# Patient Record
Sex: FEMALE | Race: WHITE | NOT HISPANIC OR LATINO
[De-identification: names, ages, dates, MRNs, and addresses within clinical notes are randomized per-mention and may not be internally consistent; named-entity substitution may affect disease eponyms.]

---

## 2020-02-04 ENCOUNTER — RESULT REVIEW (OUTPATIENT)
Age: 50
End: 2020-02-04

## 2020-02-14 PROBLEM — Z00.00 ENCOUNTER FOR PREVENTIVE HEALTH EXAMINATION: Status: ACTIVE | Noted: 2020-02-14

## 2020-03-09 ENCOUNTER — APPOINTMENT (OUTPATIENT)
Dept: SURGICAL ONCOLOGY | Facility: CLINIC | Age: 50
End: 2020-03-09
Payer: COMMERCIAL

## 2020-03-09 VITALS
SYSTOLIC BLOOD PRESSURE: 110 MMHG | RESPIRATION RATE: 18 BRPM | BODY MASS INDEX: 19.66 KG/M2 | HEART RATE: 80 BPM | DIASTOLIC BLOOD PRESSURE: 62 MMHG | WEIGHT: 118 LBS | HEIGHT: 65 IN

## 2020-03-09 DIAGNOSIS — Z86.59 PERSONAL HISTORY OF OTHER MENTAL AND BEHAVIORAL DISORDERS: ICD-10-CM

## 2020-03-09 DIAGNOSIS — Z86.79 PERSONAL HISTORY OF OTHER DISEASES OF THE CIRCULATORY SYSTEM: ICD-10-CM

## 2020-03-09 DIAGNOSIS — D24.2 BENIGN NEOPLASM OF LEFT BREAST: ICD-10-CM

## 2020-03-09 DIAGNOSIS — Z78.9 OTHER SPECIFIED HEALTH STATUS: ICD-10-CM

## 2020-03-09 DIAGNOSIS — Z86.39 PERSONAL HISTORY OF OTHER ENDOCRINE, NUTRITIONAL AND METABOLIC DISEASE: ICD-10-CM

## 2020-03-09 PROCEDURE — 99204 OFFICE O/P NEW MOD 45 MIN: CPT

## 2020-03-09 RX ORDER — ESCITALOPRAM OXALATE 10 MG/1
10 TABLET ORAL
Refills: 0 | Status: ACTIVE | COMMUNITY

## 2020-03-09 RX ORDER — ALPRAZOLAM 0.25 MG/1
0.25 TABLET ORAL
Refills: 0 | Status: ACTIVE | COMMUNITY

## 2020-03-09 NOTE — CONSULT LETTER
[Dear  ___] : Dear  [unfilled], [Consult Letter:] : I had the pleasure of evaluating your patient, [unfilled]. [Please see my note below.] : Please see my note below. [Consult Closing:] : Thank you very much for allowing me to participate in the care of this patient.  If you have any questions, please do not hesitate to contact me. [Sincerely,] : Sincerely, [DrHolden  ___] : Dr. VELAZQUEZ [FreeTextEntry2] : Immanuel Morrison MD

## 2020-03-09 NOTE — OB HISTORY
[Menarche Age ____] : menarche age [unfilled] [Definite ___ (Date)] : the last menstrual period was [unfilled] [Total Preg ___] : G[unfilled] [Live Births ___] : P[unfilled]  [FreeTextEntry2] : age 20 at first pregnancy

## 2020-03-09 NOTE — HISTORY OF PRESENT ILLNESS
[de-identified] : 49 year-old female presents for an initial consultation.  She was referred for breast imaging to evaluate a palpable left breast mass which had been present for approximately 3 years, without significant enlargement.  There were no suspicious findings on mammogram.  On ultrasound, there are bilateral breast nodules for which 6 montn follow up ultrasound was recommended, and in the area of palpable concern in the left breast at 5:00, 1 cmfn, there is a 1.1 cm mass for which ultrasound guided biopsy was recommended (BIRADS 4). \par \par Biopsy was performed on 2/4/20 and pathology was consistent with an intraductal papilloma with focal atypical ductal hyperplasia (atypical papilloma). \par \par She has a prior history of a fibroadenoma excised from her right breast over 10 years ago.  She denies any family history of breast or ovarian cancer.  She denies any additional palpable breast masses or nipple discharge.

## 2020-03-09 NOTE — PHYSICAL EXAM
[Normal] : supple, no neck mass and thyroid not enlarged [Normal Supraclavicular Lymph Nodes] : normal supraclavicular lymph nodes [Normal Axillary Lymph Nodes] : normal axillary lymph nodes [Normal] : oriented to person, place and time, with appropriate affect [FreeTextEntry1] : AB present during exam  [de-identified] : Normal S1, S2.  Regular rate and rhythm.  [de-identified] : Complete breast exam performed in supine and upright positions.  Approximately 1 cm well bordered mobile mass left breast 5:00, 1 cmfn.   No palpable right breast masses.  No tenderness, nipple discharge, inversion, deviation or enlarged axillary or supraclavicular lymph nodes bilaterally.  [de-identified] : Clear breath sounds bilaterally, normal respiratory effort.

## 2020-03-09 NOTE — ASSESSMENT
[FreeTextEntry1] : IMP:\par Intraductal papilloma with focal atypical ductal hyperplasia involving the left breast\par Probable benign nodules on ultrasound bilaterally\par \par PLAN:\par Discussed nature of and rationale of excisional left breast biopsy.  Options, risks and benefits of surgery discussed with patient. Will ultimately need bilateral ultrasound in 6 months.

## 2020-03-12 ENCOUNTER — OUTPATIENT (OUTPATIENT)
Dept: OUTPATIENT SERVICES | Facility: HOSPITAL | Age: 50
LOS: 1 days | End: 2020-03-12
Payer: COMMERCIAL

## 2020-03-12 VITALS
DIASTOLIC BLOOD PRESSURE: 59 MMHG | WEIGHT: 119.05 LBS | HEART RATE: 66 BPM | RESPIRATION RATE: 16 BRPM | TEMPERATURE: 98 F | SYSTOLIC BLOOD PRESSURE: 122 MMHG | HEIGHT: 65 IN | OXYGEN SATURATION: 97 %

## 2020-03-12 DIAGNOSIS — F41.8 OTHER SPECIFIED ANXIETY DISORDERS: ICD-10-CM

## 2020-03-12 DIAGNOSIS — Z90.89 ACQUIRED ABSENCE OF OTHER ORGANS: Chronic | ICD-10-CM

## 2020-03-12 DIAGNOSIS — D36.9 BENIGN NEOPLASM, UNSPECIFIED SITE: Chronic | ICD-10-CM

## 2020-03-12 DIAGNOSIS — Z01.818 ENCOUNTER FOR OTHER PREPROCEDURAL EXAMINATION: ICD-10-CM

## 2020-03-12 DIAGNOSIS — N63.0 UNSPECIFIED LUMP IN UNSPECIFIED BREAST: Chronic | ICD-10-CM

## 2020-03-12 DIAGNOSIS — N63.0 UNSPECIFIED LUMP IN UNSPECIFIED BREAST: ICD-10-CM

## 2020-03-12 LAB
HCT VFR BLD CALC: 39.8 % — SIGNIFICANT CHANGE UP (ref 34.5–45)
HGB BLD-MCNC: 12.6 G/DL — SIGNIFICANT CHANGE UP (ref 11.5–15.5)
MCHC RBC-ENTMCNC: 29.6 PG — SIGNIFICANT CHANGE UP (ref 27–34)
MCHC RBC-ENTMCNC: 31.7 GM/DL — LOW (ref 32–36)
MCV RBC AUTO: 93.4 FL — SIGNIFICANT CHANGE UP (ref 80–100)
NRBC # BLD: 0 /100 WBCS — SIGNIFICANT CHANGE UP (ref 0–0)
PLATELET # BLD AUTO: 165 K/UL — SIGNIFICANT CHANGE UP (ref 150–400)
RBC # BLD: 4.26 M/UL — SIGNIFICANT CHANGE UP (ref 3.8–5.2)
RBC # FLD: 11.8 % — SIGNIFICANT CHANGE UP (ref 10.3–14.5)
WBC # BLD: 5.17 K/UL — SIGNIFICANT CHANGE UP (ref 3.8–10.5)
WBC # FLD AUTO: 5.17 K/UL — SIGNIFICANT CHANGE UP (ref 3.8–10.5)

## 2020-03-12 PROCEDURE — 85027 COMPLETE CBC AUTOMATED: CPT

## 2020-03-12 PROCEDURE — G0463: CPT

## 2020-03-12 NOTE — H&P PST ADULT - NSANTHOSAYNRD_GEN_A_CORE
No. JULISA screening performed.  STOP BANG Legend: 0-2 = LOW Risk; 3-4 = INTERMEDIATE Risk; 5-8 = HIGH Risk

## 2020-03-12 NOTE — H&P PST ADULT - MS GEN HX ROS MEA POS PC
· Baseline hgb 11-12, stable in mid 9's at present  · Likely due to bleeding from decubitus ulcer   · F/u hemetest arthritis/joint pain/leg pain R/leg pain L

## 2020-03-12 NOTE — H&P PST ADULT - NSICDXPASTSURGICALHX_GEN_ALL_CORE_FT
PAST SURGICAL HISTORY:  Breast lump R breast lump removed,    History of mastoidectomy Rt, > 10 years ago    Intraductal papilloma left breast biopsy 2/2020

## 2020-03-12 NOTE — H&P PST ADULT - HISTORY OF PRESENT ILLNESS
49 year old female with  h/o removal of  R  breast lump about 10 years ago, now with palpable left breast mass which had been present for approximately 3 years, without significant enlargement. Biopsy was performed on 2/4/20 and pathology was consistent with an intraductal papilloma with focal atypical ductal hyperplasia (atypical papilloma) of left breast..  Scheduled for left breast lumpectomy on 03/18/2020.    **** Patient (or family members) denies travel outside the country , not visited any sick person.   Denies fever, cough, shortness of breadth, diarrhea throat pain or any rash.

## 2020-03-12 NOTE — H&P PST ADULT - NSICDXPASTMEDICALHX_GEN_ALL_CORE_FT
PAST MEDICAL HISTORY:  Anxiety with depression     History of osteopenia     Intraductal carcinoma of left breast     Lump, breast h/o R lumpectomy, benign, left lump breast now

## 2020-03-12 NOTE — H&P PST ADULT - NSICDXPROBLEM_GEN_ALL_CORE_FT
PROBLEM DIAGNOSES  Problem: Lump, breast  Assessment and Plan: Left breast lumpectomy   [facility] DOS      Problem: Anxiety with depression  Assessment and Plan: Instructed to continue meds &  take with sips of water in AM the day of surgery

## 2020-03-18 ENCOUNTER — APPOINTMENT (OUTPATIENT)
Dept: SURGICAL ONCOLOGY | Facility: HOSPITAL | Age: 50
End: 2020-03-18

## 2020-06-04 PROBLEM — F41.8 OTHER SPECIFIED ANXIETY DISORDERS: Chronic | Status: ACTIVE | Noted: 2020-03-12

## 2020-06-13 ENCOUNTER — OUTPATIENT (OUTPATIENT)
Dept: OUTPATIENT SERVICES | Facility: HOSPITAL | Age: 50
LOS: 1 days | End: 2020-06-13
Payer: COMMERCIAL

## 2020-06-13 DIAGNOSIS — Z11.59 ENCOUNTER FOR SCREENING FOR OTHER VIRAL DISEASES: ICD-10-CM

## 2020-06-13 DIAGNOSIS — D36.9 BENIGN NEOPLASM, UNSPECIFIED SITE: Chronic | ICD-10-CM

## 2020-06-13 DIAGNOSIS — Z90.89 ACQUIRED ABSENCE OF OTHER ORGANS: Chronic | ICD-10-CM

## 2020-06-13 DIAGNOSIS — N63.0 UNSPECIFIED LUMP IN UNSPECIFIED BREAST: Chronic | ICD-10-CM

## 2020-06-13 LAB — SARS-COV-2 RNA SPEC QL NAA+PROBE: SIGNIFICANT CHANGE UP

## 2020-06-13 PROCEDURE — U0003: CPT

## 2020-06-13 RX ORDER — ESCITALOPRAM OXALATE 10 MG/1
2 TABLET, FILM COATED ORAL
Qty: 0 | Refills: 0 | DISCHARGE

## 2020-06-13 RX ORDER — SODIUM CHLORIDE 9 MG/ML
3 INJECTION INTRAMUSCULAR; INTRAVENOUS; SUBCUTANEOUS EVERY 8 HOURS
Refills: 0 | Status: DISCONTINUED | OUTPATIENT
Start: 2020-06-16 | End: 2020-07-01

## 2020-06-13 RX ORDER — CHLORHEXIDINE GLUCONATE 213 G/1000ML
1 SOLUTION TOPICAL ONCE
Refills: 0 | Status: DISCONTINUED | OUTPATIENT
Start: 2020-06-16 | End: 2020-07-01

## 2020-06-13 RX ORDER — OMEGA-3 ACID ETHYL ESTERS 1 G
1 CAPSULE ORAL
Qty: 0 | Refills: 0 | DISCHARGE

## 2020-06-13 RX ORDER — LIDOCAINE HCL 20 MG/ML
0.2 VIAL (ML) INJECTION ONCE
Refills: 0 | Status: DISCONTINUED | OUTPATIENT
Start: 2020-06-16 | End: 2020-07-01

## 2020-06-15 ENCOUNTER — TRANSCRIPTION ENCOUNTER (OUTPATIENT)
Age: 50
End: 2020-06-15

## 2020-06-15 RX ORDER — OXYCODONE HYDROCHLORIDE 5 MG/1
5 TABLET ORAL ONCE
Refills: 0 | Status: DISCONTINUED | OUTPATIENT
Start: 2020-06-16 | End: 2020-06-16

## 2020-06-15 RX ORDER — CELECOXIB 200 MG/1
200 CAPSULE ORAL ONCE
Refills: 0 | Status: DISCONTINUED | OUTPATIENT
Start: 2020-06-16 | End: 2020-07-01

## 2020-06-15 RX ORDER — SODIUM CHLORIDE 9 MG/ML
1000 INJECTION, SOLUTION INTRAVENOUS
Refills: 0 | Status: DISCONTINUED | OUTPATIENT
Start: 2020-06-16 | End: 2020-07-01

## 2020-06-15 RX ORDER — ONDANSETRON 8 MG/1
4 TABLET, FILM COATED ORAL ONCE
Refills: 0 | Status: DISCONTINUED | OUTPATIENT
Start: 2020-06-16 | End: 2020-07-01

## 2020-06-16 ENCOUNTER — APPOINTMENT (OUTPATIENT)
Dept: SURGICAL ONCOLOGY | Facility: HOSPITAL | Age: 50
End: 2020-06-16

## 2020-06-16 ENCOUNTER — RESULT REVIEW (OUTPATIENT)
Age: 50
End: 2020-06-16

## 2020-06-16 ENCOUNTER — OUTPATIENT (OUTPATIENT)
Dept: OUTPATIENT SERVICES | Facility: HOSPITAL | Age: 50
LOS: 1 days | End: 2020-06-16
Payer: COMMERCIAL

## 2020-06-16 VITALS
SYSTOLIC BLOOD PRESSURE: 101 MMHG | HEART RATE: 72 BPM | TEMPERATURE: 98 F | OXYGEN SATURATION: 98 % | RESPIRATION RATE: 16 BRPM | DIASTOLIC BLOOD PRESSURE: 54 MMHG

## 2020-06-16 VITALS — TEMPERATURE: 97 F

## 2020-06-16 DIAGNOSIS — N63.0 UNSPECIFIED LUMP IN UNSPECIFIED BREAST: Chronic | ICD-10-CM

## 2020-06-16 DIAGNOSIS — D36.9 BENIGN NEOPLASM, UNSPECIFIED SITE: Chronic | ICD-10-CM

## 2020-06-16 DIAGNOSIS — N63.10 UNSPECIFIED LUMP IN THE RIGHT BREAST, UNSPECIFIED QUADRANT: ICD-10-CM

## 2020-06-16 DIAGNOSIS — Z90.89 ACQUIRED ABSENCE OF OTHER ORGANS: Chronic | ICD-10-CM

## 2020-06-16 LAB
ANION GAP SERPL CALC-SCNC: 7 MMOL/L — SIGNIFICANT CHANGE UP (ref 5–17)
BUN SERPL-MCNC: 20 MG/DL — SIGNIFICANT CHANGE UP (ref 7–23)
CALCIUM SERPL-MCNC: 10.4 MG/DL — SIGNIFICANT CHANGE UP (ref 8.4–10.5)
CHLORIDE SERPL-SCNC: 104 MMOL/L — SIGNIFICANT CHANGE UP (ref 96–108)
CO2 SERPL-SCNC: 29 MMOL/L — SIGNIFICANT CHANGE UP (ref 22–31)
CREAT SERPL-MCNC: 0.84 MG/DL — SIGNIFICANT CHANGE UP (ref 0.5–1.3)
GLUCOSE SERPL-MCNC: 87 MG/DL — SIGNIFICANT CHANGE UP (ref 70–99)
HCT VFR BLD CALC: 42.1 % — SIGNIFICANT CHANGE UP (ref 34.5–45)
HGB BLD-MCNC: 13.9 G/DL — SIGNIFICANT CHANGE UP (ref 11.5–15.5)
MCHC RBC-ENTMCNC: 30 PG — SIGNIFICANT CHANGE UP (ref 27–34)
MCHC RBC-ENTMCNC: 33 GM/DL — SIGNIFICANT CHANGE UP (ref 32–36)
MCV RBC AUTO: 90.7 FL — SIGNIFICANT CHANGE UP (ref 80–100)
NRBC # BLD: 0 /100 WBCS — SIGNIFICANT CHANGE UP (ref 0–0)
PLATELET # BLD AUTO: 160 K/UL — SIGNIFICANT CHANGE UP (ref 150–400)
POTASSIUM SERPL-MCNC: 4.2 MMOL/L — SIGNIFICANT CHANGE UP (ref 3.5–5.3)
POTASSIUM SERPL-SCNC: 4.2 MMOL/L — SIGNIFICANT CHANGE UP (ref 3.5–5.3)
RBC # BLD: 4.64 M/UL — SIGNIFICANT CHANGE UP (ref 3.8–5.2)
RBC # FLD: 11.9 % — SIGNIFICANT CHANGE UP (ref 10.3–14.5)
SODIUM SERPL-SCNC: 140 MMOL/L — SIGNIFICANT CHANGE UP (ref 135–145)
WBC # BLD: 6 K/UL — SIGNIFICANT CHANGE UP (ref 3.8–10.5)
WBC # FLD AUTO: 6 K/UL — SIGNIFICANT CHANGE UP (ref 3.8–10.5)

## 2020-06-16 PROCEDURE — 85027 COMPLETE CBC AUTOMATED: CPT

## 2020-06-16 PROCEDURE — 88305 TISSUE EXAM BY PATHOLOGIST: CPT

## 2020-06-16 PROCEDURE — 88305 TISSUE EXAM BY PATHOLOGIST: CPT | Mod: 26

## 2020-06-16 PROCEDURE — 19301 PARTIAL MASTECTOMY: CPT | Mod: LT

## 2020-06-16 PROCEDURE — 80048 BASIC METABOLIC PNL TOTAL CA: CPT

## 2020-06-16 NOTE — ASU PATIENT PROFILE, ADULT - PSH
Breast lump  R breast lump removed 2010  History of right mastoidectomy  2010  Intraductal papilloma  left breast biopsy 2/2020

## 2020-06-16 NOTE — ASU DISCHARGE PLAN (ADULT/PEDIATRIC) - NURSING INSTRUCTIONS
You may take Tylenol today/tonight every 6 hours for pain management. Do not exceed more than 4000mg of Tylenol in one 24 hour setting. If no contraindications, you may alternate with Ibuprofen or Naproxen 3 hours after dose of Tylenol. Ibuprofen can be taken every 6 hours. Naproxen may be taken every 12 hours.

## 2020-06-16 NOTE — ASU PATIENT PROFILE, ADULT - PMH
Anxiety with depression    Current smoker    History of osteopenia  on no med  [place] (hard of hearing)  right ear  Intraductal papilloma  left breast 2/2020  Irritable bowel syndrome (IBS)    Lump, breast  h/o R lumpectomy, benign  MVP (mitral valve prolapse)  2017, no SBE

## 2020-06-16 NOTE — ASU DISCHARGE PLAN (ADULT/PEDIATRIC) - CARE PROVIDER_API CALL
Binh Manzo  SURGERY  [address] [place] [place] [address]  Phone: [phone]  Fax: [phone]  Follow Up Time: 2 weeks

## 2020-06-16 NOTE — ASU DISCHARGE PLAN (ADULT/PEDIATRIC) - ASU DC SPECIAL INSTRUCTIONSFT
[facility]  CANCER  I  N  S  T  I  T  U  T E     Department of Surgery  Division of Surgical Oncology    Steven Payton M.D., ELMER.ESTRELLA.  Chief, Division of Surgical Oncology    Rajiv Mckeon M.D., F.A.C.S., F.A.S.LYNN.  Associate , Department of Surgery    Carlos Holly M.D., F.A.C.S.  Jeramy Pride M.D., F.A.C.S.  Eze Pace M.D., F.A.C.S.  Duane Slaughter M.D., F.A.C.S.  Jeramy Campbell M.D., F.A.C.S.  Binh Manzo M.D., F.CHRISTINAC.S.      Breast Biopsy/Lumpectomy Post-operative Instructions  1.	Supportive bra- Please bring a sports/athletic bra with you on the day of your surgery.  You will wear it home from the hospital.  You should wear the supportive bra continuously for 48 hours after the procedure, including wearing it to sleep.  Therefore, you may wear your regular bra.  You may remove the bra to shower.  The sports bra will provide support, decrease the amount of swelling at the biopsy site, and make your recovery more comfortable.    2.	Wound dressing- There is a dressing on the wound, which consists of 2 layers.  The outer layer is a clear plastic dressing (called Tegaderm) which is waterproof.  This should remain in place for 2 days post-operatively.  On the 2nd post-operative day, you should remove the clear plastic Tegaderm dressing.  Underneath the Tegaderm dressing, there are white surgical tapes (called steri strips) which are directly adherent to the skin.  These should remain on the skin, and will peel off naturally.  All of the stitches are “internal” and will dissolve naturally.    3.	Showering/Bathing- You may shower over the dressing the very next day after surgery.  Allow the water to run over the dressing, but don not scrub the area.  It is best not to sit in a bathtub or swimming pool for at least one week after surgery.    4.	Activity level- You may resume most normal daily activity as tolerated, but avoid strenuous activities such as aerobics, jogging, exercising or heavy lifting for at least 1 week after surgery.  You may return to work in 1-2 days after surgery.  You may drive as long as you are not taking any prescription pain medication.    5.	Pain Medication- You may take the prescribed medication, or you may take extra-strength Tylenol as needed.  Please don to take aspirin, Motrin, Advil or any other anti-inflammatory medications, as these medications may cause bleeding or bruising.    6.	Follow-up Appointment- Please call the office to schedule your post-operative appointment which should be approximately 10 days after your surgery. Office [phone]    7.	Bruising/Bleeding/Swelling- It is normal for there to be some bruising and swelling at the breast biopsy site, and there may be some staining of blood on to the dressing.  Some discomfort at the surgical site is expected.  If your symptoms seem excessive, or if you have any question or concerns, please call the office.      Anesthesia Precautions:  For the next 12 hours do not:   •	drive a car,  •	drink alcohol, beer, or wine,   •	make important personal or business decisions  Diet:   •	Progress diet slowly unless otherwise indicated    Physician Notification  •	Any Prescription issues  •	Bleeding that does not stop  •	Persistent nausea and vomiting  •	Pain not relieved by medications  •	Fever greater than 101®F  •	Inability to tolerate liquids or foods  •	Unable to urinate after 8 hours  Discharge and Disposition  •	Discharge to home/ group home/assisted living  •	Accompanied by Family/Spouse/ Parents/ Significant Other/ Friend/ and or Caregiver    Follow Up Care:  •	In the event that you develop a complication and you are unable to reach your own physician, you may contact:  [phone] or go to the nearest Emergency Room.   •	Please call your surgeon to schedule your follow up appointment [phone]

## 2020-06-17 PROBLEM — K58.9 IRRITABLE BOWEL SYNDROME WITHOUT DIARRHEA: Chronic | Status: ACTIVE | Noted: 2020-06-13

## 2020-06-17 PROBLEM — Z87.39 PERSONAL HISTORY OF OTHER DISEASES OF THE MUSCULOSKELETAL SYSTEM AND CONNECTIVE TISSUE: Chronic | Status: ACTIVE | Noted: 2020-03-12

## 2020-06-17 PROBLEM — I34.1 NONRHEUMATIC MITRAL (VALVE) PROLAPSE: Chronic | Status: ACTIVE | Noted: 2020-06-13

## 2020-06-17 PROBLEM — H91.90 UNSPECIFIED HEARING LOSS, UNSPECIFIED EAR: Chronic | Status: ACTIVE | Noted: 2020-06-13

## 2020-06-17 PROBLEM — K58.9 IRRITABLE BOWEL SYNDROME, UNSPECIFIED: Chronic | Status: ACTIVE | Noted: 2020-06-13

## 2020-06-17 PROBLEM — N63.0 UNSPECIFIED LUMP IN UNSPECIFIED BREAST: Chronic | Status: ACTIVE | Noted: 2020-03-12

## 2020-06-17 PROBLEM — D36.9 BENIGN NEOPLASM, UNSPECIFIED SITE: Chronic | Status: ACTIVE | Noted: 2020-06-13

## 2020-06-17 PROBLEM — F17.200 NICOTINE DEPENDENCE, UNSPECIFIED, UNCOMPLICATED: Chronic | Status: ACTIVE | Noted: 2020-06-13

## 2020-06-26 LAB — SURGICAL PATHOLOGY STUDY: SIGNIFICANT CHANGE UP

## 2020-06-29 ENCOUNTER — APPOINTMENT (OUTPATIENT)
Dept: SURGICAL ONCOLOGY | Facility: CLINIC | Age: 50
End: 2020-06-29
Payer: COMMERCIAL

## 2020-06-29 VITALS
BODY MASS INDEX: 19.66 KG/M2 | SYSTOLIC BLOOD PRESSURE: 108 MMHG | OXYGEN SATURATION: 97 % | HEART RATE: 68 BPM | WEIGHT: 118 LBS | RESPIRATION RATE: 18 BRPM | HEIGHT: 65 IN | DIASTOLIC BLOOD PRESSURE: 68 MMHG

## 2020-06-29 VITALS — TEMPERATURE: 97.5 F

## 2020-06-29 DIAGNOSIS — D24.9 BENIGN NEOPLASM OF UNSPECIFIED BREAST: ICD-10-CM

## 2020-06-29 PROCEDURE — 99024 POSTOP FOLLOW-UP VISIT: CPT

## 2020-06-29 NOTE — ASSESSMENT
[FreeTextEntry1] : IMP:\par Status post left breast lumpectomy on 6/16/20.  \par Final pathology demonstrated intraductal papilloma.  A small focus of atypical ductal hyperplasia is found within the papilloma. \par Previously identified probable benign nodules on ultrasound bilaterally.\par Referred to med onc for discussion of chemo prevention\par \par PLAN:\par Bilateral breast ultrasound 9/2020\par RTO 1 yr.

## 2020-06-29 NOTE — PHYSICAL EXAM
[FreeTextEntry1] : DT was present [de-identified] : Incision healing well. No evidence of hematoma seroma or infection.

## 2020-06-29 NOTE — HISTORY OF PRESENT ILLNESS
[de-identified] : 49 year-old female presents for an initial postop visit, status post left breast lumpectomy on 6/16/20.  Final pathology demonstrated intraductal papilloma.  A small focus of atypical ductal hyperplasia is found within the papilloma. \par \par Previous pertinent history is as follows;\par \par She was initially seen in consultation on 3/19/20.  She was referred for breast imaging to evaluate a palpable left breast mass which had been present for approximately 3 years, without significant enlargement.  There were no suspicious findings on mammogram.  On ultrasound, there are bilateral breast nodules for which 6 montn follow up ultrasound was recommended, and in the area of palpable concern in the left breast at 5:00, 1 cmfn, there is a 1.1 cm mass for which ultrasound guided biopsy was recommended (BIRADS 4). \par \par Biopsy was performed on 2/4/20 and pathology was consistent with an intraductal papilloma with focal atypical ductal hyperplasia (atypical papilloma). \par \par She has a prior history of a fibroadenoma excised from her right breast over 10 years ago.  She denies any family history of breast or ovarian cancer.  She denies any additional palpable breast masses or nipple discharge.

## 2021-01-19 NOTE — H&P PST ADULT - NS PRO LAST MENSTRUAL DATE
89F presents to ED via EMS with chest pain. Patient was seen by home care RN who suggested patient come to the ED. Patient was c/o chest pain 2-3 hours before ED arrival. EMS states they found inferior wall MI on her EKG. According to EMS, patient is at her baseline. Family was not able to give information either. She received 4 asa by EMS. Patient comes awake alert and moaning in pain. Wheezes noted on auscultation. Attempted to use  phone as patient speaks mandarin, but patient is too hard of hearing to hear the . She was placed on tele and EKG performed immediately.
03/12/20

## 2021-03-15 ENCOUNTER — RESULT REVIEW (OUTPATIENT)
Age: 51
End: 2021-03-15

## 2021-06-01 ENCOUNTER — APPOINTMENT (OUTPATIENT)
Dept: SURGICAL ONCOLOGY | Facility: CLINIC | Age: 51
End: 2021-06-01

## 2021-06-21 NOTE — HISTORY OF PRESENT ILLNESS
[de-identified] : 50 year-old female presents for a follow up visit.  She is s/p left breast lumpectomy on 6/16/20.  Final pathology demonstrated intraductal papilloma with sclerosis and a small focus of atypical ductal hyperplasia was found within the papilloma.   Med-onc for ADH????\par \par Patient underwent a bilateral screening mammogram on 1/12/2021 revealing dense breast tissue however no evidence of malignancy.  BIRADS 2 imaging. \par \par Today she is doing well, No dominant mass, nipple discharge, nipple inversion, skin change or breast pain.\par \par PERTINENT HISTORY:\par She was initially seen in consultation on 3/19/20.  She was referred for breast imaging to evaluate a palpable left breast mass which had been present for approximately 3 years, without significant enlargement.  There were no suspicious findings on mammogram.  On ultrasound, there are bilateral breast nodules for which 6 montn follow up ultrasound was recommended, and in the area of palpable concern in the left breast at 5:00, 1 cmfn, there is a 1.1 cm mass for which ultrasound guided biopsy was recommended (BIRADS 4). \par \par Biopsy was performed on 2/4/20 and pathology was consistent with an intraductal papilloma with focal atypical ductal hyperplasia (atypical papilloma). \par \par She has a prior history of a fibroadenoma excised from her right breast over 10 years ago.  She denies any family history of breast or ovarian cancer.  She denies any additional palpable breast masses or nipple discharge.

## 2021-06-21 NOTE — ASSESSMENT
[FreeTextEntry1] : IMP:\par Status post left breast lumpectomy on 6/16/20.  \par Final pathology demonstrated intraductal papilloma and a small focus of ADH\par Mammogram 1/2021- Extremely dense breast tissue. BIRADS 2 \par \par PLAN:\par Sonogram now due to dense breast tissue? \par Annual mammo/sono 1/2022\par RTO yearly \par Med-onc f/u????

## 2021-06-22 ENCOUNTER — APPOINTMENT (OUTPATIENT)
Dept: SURGICAL ONCOLOGY | Facility: CLINIC | Age: 51
End: 2021-06-22
Payer: COMMERCIAL

## 2021-07-14 PROBLEM — N60.99 ATYPICAL DUCTAL HYPERPLASIA OF BREAST: Status: ACTIVE | Noted: 2020-03-09

## 2021-07-20 ENCOUNTER — APPOINTMENT (OUTPATIENT)
Dept: SURGICAL ONCOLOGY | Facility: CLINIC | Age: 51
End: 2021-07-20
Payer: COMMERCIAL

## 2021-07-20 VITALS
DIASTOLIC BLOOD PRESSURE: 70 MMHG | HEIGHT: 65 IN | WEIGHT: 124 LBS | BODY MASS INDEX: 20.66 KG/M2 | SYSTOLIC BLOOD PRESSURE: 114 MMHG | HEART RATE: 67 BPM | OXYGEN SATURATION: 98 % | RESPIRATION RATE: 18 BRPM

## 2021-07-20 DIAGNOSIS — N60.99 UNSPECIFIED BENIGN MAMMARY DYSPLASIA OF UNSPECIFIED BREAST: ICD-10-CM

## 2021-07-20 PROCEDURE — 99214 OFFICE O/P EST MOD 30 MIN: CPT

## 2021-07-20 NOTE — HISTORY OF PRESENT ILLNESS
[de-identified] : 50 year-old female presents for a 1 year follow up visit.  Pt. is status post left breast lumpectomy on 6/16/20.  Final pathology demonstrated intraductal papilloma and a small focus of atypical ductal hyperplasia is found within the papilloma. \par \par MMG on 1/12/21 revealed extremely dense breasts but no evidence of malignancy, BIRADS 2.  \par Breast ultrasound should be considered due to very dense breast tissue. \par \par Pt. states her mom passed away from pancreatic cancer a few weeks away.  She is scheduled to see Med-onc to discuss her family history.  Her brother had colon cancer. \par \par Denies palpable breast masses, nipple discharge, skin dimpling, inversion or breast pain. \par \par \par PERTINENT HISTORY:\par She was initially seen in consultation on 3/19/20.  She was referred for breast imaging to evaluate a palpable left breast mass which had been present for approximately 3 years, without significant enlargement.  There were no suspicious findings on mammogram.  On ultrasound, there are bilateral breast nodules for which 6 montn follow up ultrasound was recommended, and in the area of palpable concern in the left breast at 5:00, 1 cmfn, there is a 1.1 cm mass for which ultrasound guided biopsy was recommended (BIRADS 4). \par \par Biopsy was performed on 2/4/20 and pathology was consistent with an intraductal papilloma with focal atypical ductal hyperplasia (atypical papilloma). \par \par She has a prior history of a fibroadenoma excised from her right breast over 10 years ago.  She denies any family history of breast or ovarian cancer.  She denies any additional palpable breast masses or nipple discharge.

## 2021-07-20 NOTE — PHYSICAL EXAM
[Normal] : supple, no neck mass and thyroid not enlarged [Normal Neck Lymph Nodes] : normal neck lymph nodes  [Normal Supraclavicular Lymph Nodes] : normal supraclavicular lymph nodes [Normal Axillary Lymph Nodes] : normal axillary lymph nodes [Normal] : oriented to person, place and time, with appropriate affect [FreeTextEntry1] : CHUCHO was present [de-identified] : healed B/L breast incisions. No dominant masses, nipple discharge, skin dimpling or inversion.

## 2021-07-20 NOTE — ASSESSMENT
[FreeTextEntry1] : IMP:\par Status post left breast lumpectomy on 6/16/20.  \par Final pathology demonstrated intraductal papilloma.  A small focus of atypical ductal hyperplasia is found within the papilloma. \par MMG on 1/12/21 revealed extremely dense breast tissue no evidence of malignancy, BIRADS 2. \par Mother passed away from pancreatic cancer; brother with hx of colon cancer- pt. scheduled to see med-onc\par \par PLAN:\par Mammogram/sonogram 1/2022 (ordered)\par RTO yearly

## 2022-02-07 ENCOUNTER — APPOINTMENT (OUTPATIENT)
Dept: ULTRASOUND IMAGING | Facility: CLINIC | Age: 52
End: 2022-02-07

## 2022-02-12 ENCOUNTER — TRANSCRIPTION ENCOUNTER (OUTPATIENT)
Age: 52
End: 2022-02-12

## 2024-01-09 NOTE — ASU PATIENT PROFILE, ADULT - PATIENT KNOW
yes Additional Notes: Patient leading up to last appointment had a nail biopsy done. She since got the results back from her PCP which states an allergic reaction. We reassured her that it was a traumatic reaction due to picking gel polish off. Detail Level: Simple Render Risk Assessment In Note?: no

## 2025-05-29 ENCOUNTER — EMERGENCY (EMERGENCY)
Facility: HOSPITAL | Age: 55
LOS: 0 days | Discharge: HOME | End: 2025-05-30
Attending: EMERGENCY MEDICINE
Payer: COMMERCIAL

## 2025-05-29 VITALS
HEART RATE: 93 BPM | WEIGHT: 111.99 LBS | DIASTOLIC BLOOD PRESSURE: 56 MMHG | TEMPERATURE: 99 F | RESPIRATION RATE: 16 BRPM | HEIGHT: 65 IN | SYSTOLIC BLOOD PRESSURE: 99 MMHG | OXYGEN SATURATION: 91 %

## 2025-05-29 DIAGNOSIS — Z88.8 ALLERGY STATUS TO OTHER DRUGS, MEDICAMENTS AND BIOLOGICAL SUBSTANCES: ICD-10-CM

## 2025-05-29 DIAGNOSIS — R70.0 ELEVATED ERYTHROCYTE SEDIMENTATION RATE: ICD-10-CM

## 2025-05-29 DIAGNOSIS — M25.561 PAIN IN RIGHT KNEE: ICD-10-CM

## 2025-05-29 DIAGNOSIS — N20.0 CALCULUS OF KIDNEY: ICD-10-CM

## 2025-05-29 DIAGNOSIS — K63.89 OTHER SPECIFIED DISEASES OF INTESTINE: ICD-10-CM

## 2025-05-29 DIAGNOSIS — Z90.89 ACQUIRED ABSENCE OF OTHER ORGANS: Chronic | ICD-10-CM

## 2025-05-29 DIAGNOSIS — K90.49 MALABSORPTION DUE TO INTOLERANCE, NOT ELSEWHERE CLASSIFIED: ICD-10-CM

## 2025-05-29 DIAGNOSIS — M25.562 PAIN IN LEFT KNEE: ICD-10-CM

## 2025-05-29 DIAGNOSIS — N63.0 UNSPECIFIED LUMP IN UNSPECIFIED BREAST: Chronic | ICD-10-CM

## 2025-05-29 DIAGNOSIS — M79.89 OTHER SPECIFIED SOFT TISSUE DISORDERS: ICD-10-CM

## 2025-05-29 DIAGNOSIS — D36.9 BENIGN NEOPLASM, UNSPECIFIED SITE: Chronic | ICD-10-CM

## 2025-05-29 DIAGNOSIS — R50.9 FEVER, UNSPECIFIED: ICD-10-CM

## 2025-05-29 LAB
ALBUMIN SERPL ELPH-MCNC: 3 G/DL — LOW (ref 3.3–5)
ALP SERPL-CCNC: 89 U/L — SIGNIFICANT CHANGE UP (ref 40–120)
ALT FLD-CCNC: 18 U/L — SIGNIFICANT CHANGE UP (ref 12–78)
ANION GAP SERPL CALC-SCNC: 2 MMOL/L — LOW (ref 5–17)
APPEARANCE UR: ABNORMAL
APTT BLD: 32.8 SEC — SIGNIFICANT CHANGE UP (ref 26.1–36.8)
AST SERPL-CCNC: 9 U/L — LOW (ref 15–37)
BASOPHILS # BLD AUTO: 0.04 K/UL — SIGNIFICANT CHANGE UP (ref 0–0.2)
BASOPHILS NFR BLD AUTO: 0.5 % — SIGNIFICANT CHANGE UP (ref 0–2)
BILIRUB SERPL-MCNC: 0.3 MG/DL — SIGNIFICANT CHANGE UP (ref 0.2–1.2)
BILIRUB UR-MCNC: NEGATIVE — SIGNIFICANT CHANGE UP
BUN SERPL-MCNC: 18 MG/DL — SIGNIFICANT CHANGE UP (ref 7–23)
CALCIUM SERPL-MCNC: 10.3 MG/DL — HIGH (ref 8.5–10.1)
CHLORIDE SERPL-SCNC: 106 MMOL/L — SIGNIFICANT CHANGE UP (ref 96–108)
CO2 SERPL-SCNC: 30 MMOL/L — SIGNIFICANT CHANGE UP (ref 22–31)
COLOR SPEC: YELLOW — SIGNIFICANT CHANGE UP
CREAT SERPL-MCNC: 0.87 MG/DL — SIGNIFICANT CHANGE UP (ref 0.5–1.3)
DIFF PNL FLD: ABNORMAL
EGFR: 79 ML/MIN/1.73M2 — SIGNIFICANT CHANGE UP
EGFR: 79 ML/MIN/1.73M2 — SIGNIFICANT CHANGE UP
EOSINOPHIL # BLD AUTO: 0.11 K/UL — SIGNIFICANT CHANGE UP (ref 0–0.5)
EOSINOPHIL NFR BLD AUTO: 1.3 % — SIGNIFICANT CHANGE UP (ref 0–6)
ERYTHROCYTE [SEDIMENTATION RATE] IN BLOOD: 92 MM/HR — HIGH (ref 0–20)
FLUAV AG NPH QL: SIGNIFICANT CHANGE UP
FLUBV AG NPH QL: SIGNIFICANT CHANGE UP
GLUCOSE SERPL-MCNC: 92 MG/DL — SIGNIFICANT CHANGE UP (ref 70–99)
GLUCOSE UR QL: NEGATIVE MG/DL — SIGNIFICANT CHANGE UP
HCG SERPL-ACNC: 1 MIU/ML — SIGNIFICANT CHANGE UP
HCT VFR BLD CALC: 35.5 % — SIGNIFICANT CHANGE UP (ref 34.5–45)
HGB BLD-MCNC: 11.2 G/DL — LOW (ref 11.5–15.5)
IMM GRANULOCYTES NFR BLD AUTO: 0.3 % — SIGNIFICANT CHANGE UP (ref 0–0.9)
INR BLD: 1.09 RATIO — SIGNIFICANT CHANGE UP (ref 0.85–1.16)
KETONES UR QL: NEGATIVE MG/DL — SIGNIFICANT CHANGE UP
LACTATE SERPL-SCNC: 0.7 MMOL/L — SIGNIFICANT CHANGE UP (ref 0.7–2)
LEUKOCYTE ESTERASE UR-ACNC: ABNORMAL
LYMPHOCYTES # BLD AUTO: 1.87 K/UL — SIGNIFICANT CHANGE UP (ref 1–3.3)
LYMPHOCYTES # BLD AUTO: 21.4 % — SIGNIFICANT CHANGE UP (ref 13–44)
MCHC RBC-ENTMCNC: 27.7 PG — SIGNIFICANT CHANGE UP (ref 27–34)
MCHC RBC-ENTMCNC: 31.5 G/DL — LOW (ref 32–36)
MCV RBC AUTO: 87.7 FL — SIGNIFICANT CHANGE UP (ref 80–100)
MONOCYTES # BLD AUTO: 0.88 K/UL — SIGNIFICANT CHANGE UP (ref 0–0.9)
MONOCYTES NFR BLD AUTO: 10.1 % — SIGNIFICANT CHANGE UP (ref 2–14)
NEUTROPHILS # BLD AUTO: 5.82 K/UL — SIGNIFICANT CHANGE UP (ref 1.8–7.4)
NEUTROPHILS NFR BLD AUTO: 66.4 % — SIGNIFICANT CHANGE UP (ref 43–77)
NITRITE UR-MCNC: NEGATIVE — SIGNIFICANT CHANGE UP
NRBC BLD AUTO-RTO: 0 /100 WBCS — SIGNIFICANT CHANGE UP (ref 0–0)
PH UR: 6.5 — SIGNIFICANT CHANGE UP (ref 5–8)
PLATELET # BLD AUTO: 325 K/UL — SIGNIFICANT CHANGE UP (ref 150–400)
POTASSIUM SERPL-MCNC: 4.6 MMOL/L — SIGNIFICANT CHANGE UP (ref 3.5–5.3)
POTASSIUM SERPL-SCNC: 4.6 MMOL/L — SIGNIFICANT CHANGE UP (ref 3.5–5.3)
PROT SERPL-MCNC: 7.7 GM/DL — SIGNIFICANT CHANGE UP (ref 6–8.3)
PROT UR-MCNC: 30 MG/DL
PROTHROM AB SERPL-ACNC: 12.6 SEC — SIGNIFICANT CHANGE UP (ref 9.9–13.4)
RBC # BLD: 4.05 M/UL — SIGNIFICANT CHANGE UP (ref 3.8–5.2)
RBC # FLD: 12.3 % — SIGNIFICANT CHANGE UP (ref 10.3–14.5)
RSV RNA NPH QL NAA+NON-PROBE: SIGNIFICANT CHANGE UP
SARS-COV-2 RNA SPEC QL NAA+PROBE: SIGNIFICANT CHANGE UP
SODIUM SERPL-SCNC: 138 MMOL/L — SIGNIFICANT CHANGE UP (ref 135–145)
SOURCE RESPIRATORY: SIGNIFICANT CHANGE UP
SP GR SPEC: 1.02 — SIGNIFICANT CHANGE UP (ref 1–1.03)
UROBILINOGEN FLD QL: 1 MG/DL — SIGNIFICANT CHANGE UP (ref 0.2–1)
WBC # BLD: 8.75 K/UL — SIGNIFICANT CHANGE UP (ref 3.8–10.5)
WBC # FLD AUTO: 8.75 K/UL — SIGNIFICANT CHANGE UP (ref 3.8–10.5)

## 2025-05-29 PROCEDURE — 71045 X-RAY EXAM CHEST 1 VIEW: CPT | Mod: 26

## 2025-05-29 PROCEDURE — 99284 EMERGENCY DEPT VISIT MOD MDM: CPT

## 2025-05-29 RX ORDER — KETOROLAC TROMETHAMINE 30 MG/ML
15 INJECTION, SOLUTION INTRAMUSCULAR; INTRAVENOUS ONCE
Refills: 0 | Status: DISCONTINUED | OUTPATIENT
Start: 2025-05-29 | End: 2025-05-29

## 2025-05-29 RX ADMIN — KETOROLAC TROMETHAMINE 15 MILLIGRAM(S): 30 INJECTION, SOLUTION INTRAMUSCULAR; INTRAVENOUS at 21:46

## 2025-05-29 RX ADMIN — Medication 1550 MILLILITER(S): at 21:46

## 2025-05-29 NOTE — ED ADULT NURSE NOTE - NS ED NURSE RECORD ANOTHER HT AND WT
"Grand Whatcom Connie Bustos  3/21/1931  [id number]    Lillie Huang MD    Fax ([phone]) or scan/email ([email or web address]) this completed document to [facility]  Lillie Huang MD  [facility] AND \[facility] [place] [facility]\""  [address]  [place] [address]  Phone: [phone]  Fax: [phone]  ______________________________________________________________________    SHAWN EMEK MD         SYSTEM ID:  [id number]   SHAWN MEEK MD         SYSTEM ID:  [id number]   SHAWN MEEK MD         SYSTEM ID:  [id number]   SHAWN MEEK MD         SYSTEM ID:  [id number]   Echocardiogram Complete     Value    LVEF  60-65%    Narrative    [id number]  [id number]  [id number]  [id number]^MARYBEL^YVONNE     [facility]  [address] [place] [address]     Name: ELIDA PUENTES  MRN: [id number]  : 1931  Study Date: 2024 02:35 PM  Age: 89 yrs  Gender: Female  Patient Location: [facility]  Reason For Study: CVA  Ordering Physician: YVONNE NO  Performed By: SWATHI Emmanuel, Yes

## 2025-05-29 NOTE — ED PROVIDER NOTE - NSFOLLOWUPINSTRUCTIONS_ED_ALL_ED_FT
You must follow-up with your primary care doctor, gastroenterologist, and the rheumatologist.    Take doxycycline once a morning once at night for 5 days.    Our referral coordinator will call you with rheumatology appointment.    Please take 600 mg of Ibuprofen (aka Motrin, Advil) and/or 650 mg Acetaminophen (aka Tylenol) every 6 hours, as needed, for mild-moderate pain.      Seek immediate medical assistance for any new or worsening symptoms. If you have issues obtaining follow up, please call: [phone] ([phone] or [phone]  to obtain a doctor or specialist who takes your insurance in your area.    Your CAT scan report is not complete yet.  There was a technical difficulty with your CAT scan.  The images were done but the radiologist did not have access to read it.  CAT scan technicians informed me that this will be rectified this morning.  At that time radiology will read your CAT scan report.  Please call the ER back later today Friday, May 30 for your CAT scan report review.

## 2025-05-29 NOTE — ED ADULT NURSE NOTE - CHIEF COMPLAINT QUOTE
Complaining of  fever for 4 days, swelling on the knees and ankles "burning" on bilateral feet seen by PCP on Tuesday did CT scan of abdomen yesterday h/o recently right kidney stones on Cipro, IBS, OA, chronic lower back pain. Last tylenol 4:30 pm

## 2025-05-29 NOTE — ED PROVIDER NOTE - ATTENDING CONTRIBUTION TO CARE
Name band; Patient evaluated and seen with PGY3 Dr Cardoza as a shared visit - agree with above history and physical - pt examined and seen by me personally - findings as seen: Patient otherwise evaluated for fever bilateral knee joint swelling/inflammation, ankle as well noted with some warmth and swelling.  Suspicion is for fever related to polyarthralgia and otherwise lab work sent for Lyme/tickborne diseases.  Patient otherwise nontoxic and well-appearing and will also refer to rheumatology for further care and workup.

## 2025-05-29 NOTE — ED PROVIDER NOTE - PROGRESS NOTE DETAILS
Gerardo -   I had an extensive discussion with the patient.  There was a problem with CAT scan where she got the CAT scan done today in the ER that was ordered by the previous team and CAT scan techs do have access to the images however they are having trouble sending it to the radiologist and the radiologist cannot make a report unless they see the images.    The patient's had fever for about 4 days at this point.  She had a CAT scan done as outpatient on May 28 2 days into the fevers which showed intrarenal kidney stone.  It showed nonspecific colonic wall thickening.  No other cute findings on it.  The radiologist on that report recommended GI follow-up.    The patient is well-appearing now with normal vital signs.  She has fevers bilateral ankle and feet swelling bilateral knee swelling and pain in those areas as well.  There is a family history of potential lupus disease.    The patient's clinical presentation is consistent with most likely an autoimmune rheumatological process.  Her ESR is elevated to 91.  Upon discharge we will make a note to the referral coordinator to get an expedited rheumatology lockable form.    Cannot exclude infectious cause of fevers joint pain arthralgias polyarthralgias and swelling however.  On exam it does not look like septic joints.  Tickborne panel was sent for.  Pending results.  Could also be viral in nature.  Out of abundance of precaution will cover on doxycycline twice a day for 5 days.    Patient understands she needs to follow-up with rheumatology gastroenterology and primary care doctor.    I gave her strict return precautions for any new or worsening symptoms.

## 2025-05-29 NOTE — ED PROVIDER NOTE - NSICDXFAMILYHX_GEN_ALL_CORE_FT
FAMILY HISTORY:  Family history of colitis  Family history of colon cancer  Family history of osteoporosis

## 2025-05-29 NOTE — ED ADULT NURSE NOTE - GENITOURINARY ASSESSMENT
Area M Indication Text: Tumors in this location are included in Area M (cheek, forehead, scalp, neck, jawline and pretibial skin).  Mohs surgery is indicated for tumors in these anatomic locations. - - -

## 2025-05-29 NOTE — ED PROVIDER NOTE - CLINICAL SUMMARY MEDICAL DECISION MAKING FREE TEXT BOX
This is a 54-year-old female, history of osteoporosis, presenting to the emergency department today with fever.  Patient states that she has been having fevers for the past 4 days.  The patient states that she also started having bilateral knee swelling without pain.  The patient states that she did a lidocaine shot of the foot and expense of away and since that is when she noticed the knee pain.  The patient states that it started off on the right and then right greater to the left.  The patient states that she is unable to walk secondary to discomfort.  In addition to this the patient states that she has chronic back pain.  The patient feels that she had a CT scan recently and it showed that she had a right renal stone within the kidney.  The patient otherwise denies chest pain, headaches, visual changes, nausea, vomiting, diarrhea or urinary symptoms.  ROS is otherwise negative.    VSS.  PE. BL knee swelling, patient Comfortable appearing on examination, in no acute distress, no reproducible tenderness to palpation of the abdomen.  Patient lungs are auscultation.  Cardiac examination with regular rate and rhythm, no rubs murmurs or gallops.  Patient with dry mucous membranes.  Skin  hot to touch.    Differential includes but not limited to infectious process secondary to viral syndrome, pneumonia, urinary tract infection, low suspicion for septic arthritis given the swelling, no tenderness to palpation, no edema or warmth of the knee.  Will however obtain ESR, CRP.  Will obtain basic labs, chest x-ray, UA, UC.  Final disposition pending labs imaging and reassessment. This is a 54-year-old female, history of osteoporosis, presenting to the emergency department today with fever.  Patient states that she has been having fevers for the past 4 days.  The patient states that she also started having bilateral knee swelling without pain.  The patient states that she did a lidocaine shot of the foot and expense of away and since that is when she noticed the knee pain.  The patient states that it started off on the right and then right greater to the left.  The patient states that she is unable to walk secondary to discomfort.  In addition to this the patient states that she has chronic back pain.  The patient feels that she had a CT scan recently and it showed that she had a right renal stone within the kidney.  The patient otherwise denies chest pain, headaches, visual changes, nausea, vomiting, diarrhea or urinary symptoms.  ROS is otherwise negative.    VSS.  PE. BL knee swelling, patient Comfortable appearing on examination, in no acute distress, no reproducible tenderness to palpation of the abdomen.  Patient lungs are auscultation.  Cardiac examination with regular rate and rhythm, no rubs murmurs or gallops.  Patient with dry mucous membranes.  Skin  hot to touch.    Differential includes but not limited to infectious process secondary to viral syndrome, pneumonia, urinary tract infection, low suspicion for septic arthritis given the swelling, no tenderness to palpation, no edema or warmth of the knee.  Will however obtain ESR, CRP.  Will obtain basic labs, chest x-ray, UA, UC.  Final disposition pending labs imaging and reassessment.    Patient pending results on CT abdomen otherwise signed out to Dr. Carrillo pending results

## 2025-05-29 NOTE — ED PROVIDER NOTE - NS ED ATTENDING STATEMENT MOD
I have seen and examined this patient and fully participated in the care of this patient as the teaching attending.  The service was shared with the MONICA.  I reviewed and verified the documentation. Attending with

## 2025-05-29 NOTE — ED PROVIDER NOTE - NSFOLLOWUPCLINICS_GEN_ALL_ED_FT
[facility]  Rheumatology  [address]  [place] [address]  Phone: [phone]  Fax:     [facility]  Rheumatology  [address] [place] [address]  Phone: [phone]  Fax: [phone]

## 2025-05-29 NOTE — ED PROVIDER NOTE - NSICDXPASTSURGICALHX_GEN_ALL_CORE_FT
PAST SURGICAL HISTORY:  Breast lump R breast lump removed 2010    History of right mastoidectomy 2010    Intraductal papilloma left breast biopsy 2/2020

## 2025-05-29 NOTE — ED PROVIDER NOTE - PATIENT PORTAL LINK FT
You can access the FollowMyHealth Patient Portal offered by [facility] by registering at the following website: [email or web address]. By joining "[employer]’s FollowMyHealth portal, you will also be able to view your health information using other applications (apps) compatible with our system.

## 2025-05-29 NOTE — ED ADULT NURSE NOTE - NSICDXPASTMEDICALHX_GEN_ALL_CORE_FT
PAST MEDICAL HISTORY:  Anxiety with depression     Current smoker     History of osteopenia on no med    [place] (hard of hearing) right ear    Intraductal papilloma left breast 2/2020    Irritable bowel syndrome (IBS)     Lump, breast h/o R lumpectomy, benign    MVP (mitral valve prolapse) 2017, no SBE

## 2025-05-29 NOTE — ED PROVIDER NOTE - NSICDXPASTMEDICALHX_GEN_ALL_CORE_FT
PAST MEDICAL HISTORY:  Anxiety with depression     Current smoker     History of osteopenia on no med    [place] (hard of hearing) right ear    Intraductal papilloma left breast 2/2020    Irritable bowel syndrome (IBS)     Lump, breast h/o R lumpectomy, benign    MVP (mitral valve prolapse) 2017, no SBE

## 2025-05-29 NOTE — ED ADULT NURSE NOTE - OBJECTIVE STATEMENT
pt aaox4, ambulatory with steady gait. pt presents with a fever for the last week with b/l knee swelling and lower leg edema for weeks now. Pt reports that she thought the fever is from a stone that was shown on a CT scan recently. Denies CP, SOB, headache, nvd,  symptoms. pt placed on cardiac monitor.

## 2025-05-30 VITALS
TEMPERATURE: 99 F | DIASTOLIC BLOOD PRESSURE: 61 MMHG | OXYGEN SATURATION: 98 % | HEART RATE: 89 BPM | SYSTOLIC BLOOD PRESSURE: 99 MMHG | RESPIRATION RATE: 18 BRPM

## 2025-05-30 LAB
B MICROTI DNA BLD QL NAA+PROBE: SIGNIFICANT CHANGE UP
BABESIA 18S RRNA BLD QL NAA+PROBE: SIGNIFICANT CHANGE UP
CRP SERPL-MCNC: 65 MG/L — HIGH

## 2025-05-30 PROCEDURE — 74176 CT ABD & PELVIS W/O CONTRAST: CPT | Mod: 26

## 2025-05-30 PROCEDURE — 76705 ECHO EXAM OF ABDOMEN: CPT | Mod: 26

## 2025-05-30 PROCEDURE — 93010 ELECTROCARDIOGRAM REPORT: CPT

## 2025-05-30 RX ORDER — DOXYCYCLINE HYCLATE 100 MG
100 TABLET ORAL ONCE
Refills: 0 | Status: COMPLETED | OUTPATIENT
Start: 2025-05-30 | End: 2025-05-30

## 2025-05-30 RX ORDER — DOXYCYCLINE HYCLATE 100 MG
1 TABLET ORAL
Qty: 14 | Refills: 0
Start: 2025-05-30 | End: 2025-06-05

## 2025-05-30 RX ADMIN — Medication 100 MILLIGRAM(S): at 06:26

## 2025-05-31 LAB
CULTURE RESULTS: NO GROWTH — SIGNIFICANT CHANGE UP
SPECIMEN SOURCE: SIGNIFICANT CHANGE UP

## 2025-06-02 LAB — B BURGDOR DNA SPEC QL NAA+PROBE: NEGATIVE — SIGNIFICANT CHANGE UP

## 2025-06-03 LAB
A PHAGOCYTOPH DNA BLD QL NAA+PROBE: NEGATIVE — SIGNIFICANT CHANGE UP
E CHAFFEENSIS DNA BLD QL NAA+PROBE: NEGATIVE — SIGNIFICANT CHANGE UP
E EWINGII DNA SPEC QL NAA+PROBE: NEGATIVE — SIGNIFICANT CHANGE UP
EHRLICHIA DNA SPEC QL NAA+PROBE: NEGATIVE — SIGNIFICANT CHANGE UP
R RICKETTSI AB SER-ACNC: SIGNIFICANT CHANGE UP
R RICKETTSI IGM SER-ACNC: SIGNIFICANT CHANGE UP
RICK SF IGG TITR SER IF: SIGNIFICANT CHANGE UP
RICK SF IGM TITR SER IF: SIGNIFICANT CHANGE UP
ROCKY MT SPOTTED FEVER COMMENT: SIGNIFICANT CHANGE UP

## 2025-06-04 LAB
CULTURE RESULTS: SIGNIFICANT CHANGE UP
CULTURE RESULTS: SIGNIFICANT CHANGE UP
SPECIMEN SOURCE: SIGNIFICANT CHANGE UP
SPECIMEN SOURCE: SIGNIFICANT CHANGE UP